# Patient Record
Sex: FEMALE | Race: WHITE | NOT HISPANIC OR LATINO | URBAN - METROPOLITAN AREA
[De-identification: names, ages, dates, MRNs, and addresses within clinical notes are randomized per-mention and may not be internally consistent; named-entity substitution may affect disease eponyms.]

---

## 2017-09-29 ENCOUNTER — EMERGENCY (EMERGENCY)
Facility: HOSPITAL | Age: 32
LOS: 1 days | Discharge: HOME | End: 2017-09-29
Attending: EMERGENCY MEDICINE | Admitting: EMERGENCY MEDICINE
Payer: MEDICAID

## 2017-09-29 VITALS
RESPIRATION RATE: 18 BRPM | DIASTOLIC BLOOD PRESSURE: 79 MMHG | TEMPERATURE: 99 F | HEART RATE: 88 BPM | OXYGEN SATURATION: 99 % | SYSTOLIC BLOOD PRESSURE: 136 MMHG | HEIGHT: 64 IN | WEIGHT: 132.28 LBS

## 2017-09-29 DIAGNOSIS — Z3A.01 LESS THAN 8 WEEKS GESTATION OF PREGNANCY: ICD-10-CM

## 2017-09-29 DIAGNOSIS — O20.9 HEMORRHAGE IN EARLY PREGNANCY, UNSPECIFIED: ICD-10-CM

## 2017-09-29 LAB
ANION GAP SERPL CALC-SCNC: 16 MMOL/L — SIGNIFICANT CHANGE UP (ref 5–17)
APPEARANCE UR: CLEAR — SIGNIFICANT CHANGE UP
BASOPHILS NFR BLD AUTO: 0.3 % — SIGNIFICANT CHANGE UP (ref 0–2)
BILIRUB UR-MCNC: NEGATIVE — SIGNIFICANT CHANGE UP
BLD GP AB SCN SERPL QL: NEGATIVE — SIGNIFICANT CHANGE UP
BUN SERPL-MCNC: 14 MG/DL — SIGNIFICANT CHANGE UP (ref 7–23)
CALCIUM SERPL-MCNC: 10.5 MG/DL — SIGNIFICANT CHANGE UP (ref 8.4–10.5)
CHLORIDE SERPL-SCNC: 98 MMOL/L — SIGNIFICANT CHANGE UP (ref 96–108)
CO2 SERPL-SCNC: 23 MMOL/L — SIGNIFICANT CHANGE UP (ref 22–31)
COLOR SPEC: YELLOW — SIGNIFICANT CHANGE UP
CREAT SERPL-MCNC: 0.81 MG/DL — SIGNIFICANT CHANGE UP (ref 0.5–1.3)
DIFF PNL FLD: (no result)
EOSINOPHIL NFR BLD AUTO: 1.9 % — SIGNIFICANT CHANGE UP (ref 0–6)
GLUCOSE SERPL-MCNC: 100 MG/DL — HIGH (ref 70–99)
GLUCOSE UR QL: NEGATIVE — SIGNIFICANT CHANGE UP
HCG SERPL-ACNC: HIGH MIU/ML
HCG UR QL: POSITIVE — SIGNIFICANT CHANGE UP
HCT VFR BLD CALC: 44.7 % — SIGNIFICANT CHANGE UP (ref 34.5–45)
HGB BLD-MCNC: 15.1 G/DL — SIGNIFICANT CHANGE UP (ref 11.5–15.5)
INR BLD: 1.02 — SIGNIFICANT CHANGE UP (ref 0.88–1.16)
KETONES UR-MCNC: NEGATIVE — SIGNIFICANT CHANGE UP
LEUKOCYTE ESTERASE UR-ACNC: NEGATIVE — SIGNIFICANT CHANGE UP
LYMPHOCYTES # BLD AUTO: 15.3 % — SIGNIFICANT CHANGE UP (ref 13–44)
MCHC RBC-ENTMCNC: 31.4 PG — SIGNIFICANT CHANGE UP (ref 27–34)
MCHC RBC-ENTMCNC: 33.8 G/DL — SIGNIFICANT CHANGE UP (ref 32–36)
MCV RBC AUTO: 92.9 FL — SIGNIFICANT CHANGE UP (ref 80–100)
MONOCYTES NFR BLD AUTO: 7.7 % — SIGNIFICANT CHANGE UP (ref 2–14)
NEUTROPHILS NFR BLD AUTO: 74.8 % — SIGNIFICANT CHANGE UP (ref 43–77)
NITRITE UR-MCNC: NEGATIVE — SIGNIFICANT CHANGE UP
PH UR: 5.5 — SIGNIFICANT CHANGE UP (ref 5–8)
PLATELET # BLD AUTO: 246 K/UL — SIGNIFICANT CHANGE UP (ref 150–400)
POTASSIUM SERPL-MCNC: 4.1 MMOL/L — SIGNIFICANT CHANGE UP (ref 3.5–5.3)
POTASSIUM SERPL-SCNC: 4.1 MMOL/L — SIGNIFICANT CHANGE UP (ref 3.5–5.3)
PROT UR-MCNC: NEGATIVE MG/DL — SIGNIFICANT CHANGE UP
PROTHROM AB SERPL-ACNC: 11.3 SEC — SIGNIFICANT CHANGE UP (ref 9.8–12.7)
RBC # BLD: 4.81 M/UL — SIGNIFICANT CHANGE UP (ref 3.8–5.2)
RBC # FLD: 12.6 % — SIGNIFICANT CHANGE UP (ref 10.3–16.9)
RH IG SCN BLD-IMP: POSITIVE — SIGNIFICANT CHANGE UP
SODIUM SERPL-SCNC: 137 MMOL/L — SIGNIFICANT CHANGE UP (ref 135–145)
SP GR SPEC: 1.01 — SIGNIFICANT CHANGE UP (ref 1–1.03)
UROBILINOGEN FLD QL: 0.2 E.U./DL — SIGNIFICANT CHANGE UP
WBC # BLD: 11.4 K/UL — HIGH (ref 3.8–10.5)
WBC # FLD AUTO: 11.4 K/UL — HIGH (ref 3.8–10.5)

## 2017-09-29 PROCEDURE — 81001 URINALYSIS AUTO W/SCOPE: CPT

## 2017-09-29 PROCEDURE — 36415 COLL VENOUS BLD VENIPUNCTURE: CPT

## 2017-09-29 PROCEDURE — 81025 URINE PREGNANCY TEST: CPT

## 2017-09-29 PROCEDURE — 76817 TRANSVAGINAL US OBSTETRIC: CPT

## 2017-09-29 PROCEDURE — 76801 OB US < 14 WKS SINGLE FETUS: CPT

## 2017-09-29 PROCEDURE — 85610 PROTHROMBIN TIME: CPT

## 2017-09-29 PROCEDURE — 80048 BASIC METABOLIC PNL TOTAL CA: CPT

## 2017-09-29 PROCEDURE — 86850 RBC ANTIBODY SCREEN: CPT

## 2017-09-29 PROCEDURE — 86900 BLOOD TYPING SEROLOGIC ABO: CPT

## 2017-09-29 PROCEDURE — 86901 BLOOD TYPING SEROLOGIC RH(D): CPT

## 2017-09-29 PROCEDURE — 76801 OB US < 14 WKS SINGLE FETUS: CPT | Mod: 26

## 2017-09-29 PROCEDURE — 84702 CHORIONIC GONADOTROPIN TEST: CPT

## 2017-09-29 PROCEDURE — 76817 TRANSVAGINAL US OBSTETRIC: CPT | Mod: 26

## 2017-09-29 PROCEDURE — 99285 EMERGENCY DEPT VISIT HI MDM: CPT

## 2017-09-29 PROCEDURE — 85025 COMPLETE CBC W/AUTO DIFF WBC: CPT

## 2017-09-29 PROCEDURE — 99284 EMERGENCY DEPT VISIT MOD MDM: CPT

## 2017-09-29 NOTE — CONSULT NOTE ADULT - SUBJECTIVE AND OBJECTIVE BOX
31yo  at 5w by LMP 17 presents for vaginal bleeding. She reports 1 brief episode of bright red blood that resolved to spotting. She reports mild spotting now. She reports mild pelvic cramping but denies contracitons, lof. she otherwise has no symptoms    GynHx: LMP 17  denies h/o fibroids, polyps, ovarian cysts  h/o abnormal pap s/p cpossible cryotherapy  denies PMH, PSH   NKDA    Vital Signs Last 24 Hrs  T(C): 36.7 (30 Sep 2017 00:37), Max: 37.3 (29 Sep 2017 18:46)  T(F): 98.1 (30 Sep 2017 00:37), Max: 99.1 (29 Sep 2017 18:46)  HR: 87 (30 Sep 2017 00:37) (87 - 88)  BP: 117/73 (30 Sep 2017 00:37) (117/73 - 136/79)  BP(mean): --  RR: 18 (30 Sep 2017 00:37) (18 - 18)  SpO2: 81% (30 Sep 2017 00:37) (81% - 99%)  Gen: NAD  Card: RRR no m/r/g  Pulm: CTAB no crackles or wheezes  Abd: nontender, nondistended, no rebound or guarding  : normal appearing external genitalia. on speculum exam no blood visualized in the vaginal vault. on bimanual exam no tenderness ob b/l adnex or cervix                          15.1   11.4  )-----------( 246      ( 29 Sep 2017 19:23 )             44.7       137  |  98  |  14  ----------------------------<  100<H>  4.1   |  23  |  0.81    Ca    10.5      29 Sep 2017 19:23    Urinalysis Basic - ( 29 Sep 2017 19:26 )    Color: x / Appearance: x / SG: x / pH: x  Gluc: x / Ketone: x  / Bili: x / Urobili: x   Blood: x / Protein: x / Nitrite: x   Leuk Esterase: x / RBC: < 5 /HPF / WBC < 5 /HPF   Sq Epi: x / Non Sq Epi: Few /HPF / Bacteria: Present /HPF    [facility] [place] [id number]    TVUS:  FINDINGS:   By dates, the estimated gestational age is 5 weeks 1 day.    Mean sac diameter is 1.0 cm, corresponding to a gestational age of 5   weeks 5 days.  A fetal pole is not identified.  A yolk sac is visible with internal diameter 0.2 cm, which is normal.    The cervix is closed with a length of 3.3 cm.    The uterus is anteverted. The uterus is 10.4 x 4.5 x 5.0 cm.  No   myometrial abnormalities are seen. There is trace endometrial fluid.   Trace free fluid is seen in the cul-de-sac.    The right ovary is normal in size, measuring 3.6 x 1.8 x 2.0 cm. No right   ovarian masses are seen. The left ovary is normal in size, measuring 4.5   x 1.9 x 2.1 cm. The left ovary contains a 2.3 cm corpus luteum. Doppler   evaluation demonstrates flow to both ovaries with no evidence of torsion.     In the left adnexa, there is a 4.0 x 1.3 x 1.2 cm tubular simple cystic   structure suspicious for hydrosalpinx. No significant free fluid is   identified.      IMPRESSION:   1.  Identification of an intrauterine gestational sac and yolk sac   without visualization of a fetal pole. This may be related to early stage   of pregnancy. Recommend repeat ultrasound in 7-14 days or as clinically   warranted. Closed cervix.  2.  Tubular cystic structure left adnexa is suspicious for hydrosalpinx.

## 2017-09-29 NOTE — CONSULT NOTE ADULT - ASSESSMENT
31yo  at 5w presents for vaginal bleeding now resolved. TVUS showed IUP with left adnexa suspicious for hydrosalpinx. cannot rule out heterotopic pregnancy  -recommend patient be discharged home to return to ED in 48 hours for repeat beta hCG and and TVUS  -patient to return to ED sooner if she experiences severe pelvic pain not improved with tylenol, heavy vaginal bleeding, or fever greater than 100.4F    plan discussed with Dr. Do

## 2017-09-30 VITALS
HEART RATE: 87 BPM | RESPIRATION RATE: 18 BRPM | DIASTOLIC BLOOD PRESSURE: 73 MMHG | SYSTOLIC BLOOD PRESSURE: 117 MMHG | OXYGEN SATURATION: 81 % | TEMPERATURE: 98 F

## 2017-09-30 RX ORDER — ACETAMINOPHEN 500 MG
1000 TABLET ORAL ONCE
Qty: 0 | Refills: 0 | Status: DISCONTINUED | OUTPATIENT
Start: 2017-09-30 | End: 2017-10-03

## 2017-09-30 NOTE — ED PROVIDER NOTE - PHYSICAL EXAMINATION
CONSTITUTIONAL: Well appearing, well nourished, awake, alert and in no apparent distress.  HEENT: Head is atraumatic. Eyes clear bilaterally, normal EOMI. Airway patent.  CARDIAC: Normal rate, regular rhythm.  Heart sounds S1, S2.   RESPIRATORY: Breath sounds clear and equal bilaterally.  GASTROINTESTINAL: Abdomen soft, non-tender, no guarding.  MUSCULOSKELETAL: Spine appears normal, range of motion is not limited, no muscle or joint tenderness.   NEUROLOGICAL: Alert and oriented, no focal deficits, no motor or sensory deficits.  SKIN: Skin normal color for race, warm, dry and intact. No evidence of rash.  PSYCHIATRIC: Alert and oriented to person, place, time/situation. normal mood and affect. no apparent risk to self or others.  Pelvic: deferred to gyn

## 2017-09-30 NOTE — ED ADULT NURSE REASSESSMENT NOTE - NS ED NURSE REASSESS COMMENT FT1
Patient has been discharged home instructions given, patient verbalized understanding regarding to come back to the ED U/S blood work.

## 2017-09-30 NOTE — ED PROVIDER NOTE - OBJECTIVE STATEMENT
Pt previously healthy  at 5w by LMP 17 w episode of vaginal  bleeding bright red now w some residual spotting. some assoc pelvic cramping, no contractions, abd pain, n/v/f/c, sob, cp. Had intercourse last night. Does not have established gyn.

## 2017-09-30 NOTE — ED PROVIDER NOTE - MEDICAL DECISION MAKING DETAILS
VB in pregnancy, US reviewed gyn consulted, otherwise hds, cbc stable VB in pregnancy, US and labs reviewed, gyn consulted for concern for heterotopic vs hydrsalpinx, otherwise hds, cbc stable, advised return in 48 hrs for rpt betahcg and TVUS.   Discussed with pt results of work up, strict return precautions, and need for follow up.  Pt expressed understanding and agrees with plan.

## 2017-10-01 ENCOUNTER — EMERGENCY (EMERGENCY)
Facility: HOSPITAL | Age: 32
LOS: 1 days | Discharge: HOME | End: 2017-10-01
Attending: EMERGENCY MEDICINE | Admitting: EMERGENCY MEDICINE
Payer: MEDICAID

## 2017-10-01 VITALS
RESPIRATION RATE: 16 BRPM | HEART RATE: 98 BPM | WEIGHT: 132.28 LBS | TEMPERATURE: 99 F | DIASTOLIC BLOOD PRESSURE: 82 MMHG | OXYGEN SATURATION: 99 % | SYSTOLIC BLOOD PRESSURE: 134 MMHG

## 2017-10-01 VITALS
SYSTOLIC BLOOD PRESSURE: 110 MMHG | RESPIRATION RATE: 16 BRPM | HEART RATE: 89 BPM | TEMPERATURE: 99 F | DIASTOLIC BLOOD PRESSURE: 65 MMHG | OXYGEN SATURATION: 99 %

## 2017-10-01 LAB
EXTRA GREEN TOP TUBE: SIGNIFICANT CHANGE UP
EXTRA LAVENDER TOP TUBE: SIGNIFICANT CHANGE UP
HCG SERPL-ACNC: HIGH MIU/ML

## 2017-10-01 PROCEDURE — 99284 EMERGENCY DEPT VISIT MOD MDM: CPT | Mod: 25

## 2017-10-01 PROCEDURE — 99285 EMERGENCY DEPT VISIT HI MDM: CPT

## 2017-10-01 PROCEDURE — 76830 TRANSVAGINAL US NON-OB: CPT | Mod: 26

## 2017-10-01 PROCEDURE — 76830 TRANSVAGINAL US NON-OB: CPT

## 2017-10-01 PROCEDURE — 84702 CHORIONIC GONADOTROPIN TEST: CPT

## 2017-10-01 NOTE — ED PROVIDER NOTE - MEDICAL DECISION MAKING DETAILS
Pt returns for repeat ? HCG and TVUS. Will d/w gyn. Pt returns for repeat HCG and TVUS. IUP w/ hydrosalpinx, possible heterotopic given hx HCG. Will d/w gyn. Will check lab, TVUS. Dispo pending w/u and clinical status

## 2017-10-01 NOTE — ED PROVIDER NOTE - PROGRESS NOTE DETAILS
Gyn paged Radiology at bedside to perform US D/w Gyn. Requesting repeat official sono. They will see the pt Gyn re-paged. Sono performed by radiology resident Gyn re-paged. Sono performed by radiology resident, report to follow Gyn will come and see the pt Pt seen by gyn, US reviewed, d/w attending Dr Do. Pt to return to OB in 1 week for repeat US and HCG. Return precautions.

## 2017-10-01 NOTE — ED PROVIDER NOTE - OBJECTIVE STATEMENT
Pt with no sig PMHx presents for repeat visit. Pt is , LMP  @ 5 weeks gestation returns for repeat HCG and US. Pt was seen in the ED 2 days ago for cramping and vaginal bleeding. Pt had US at that time which showed intrauterine gestational sac and yolk sac, but no fetal pole. US at that time also showed Tubular cystic structure left adnexa is suspicious for hydrosalpinx. Pt was seen by gyn at that time, and was advised to return in 2 days for repeat HCG and TVUS as they could not r/o heterotopic. Pt reports mild intermittent cramping and minimal intermittent spotting.

## 2017-10-01 NOTE — ED ADULT NURSE NOTE - OBJECTIVE STATEMENT
32y F, , 5weeks pregnant, presents to ED for evaluation. States previously seen in ER and US and blood work was performed, states was urged to come to ER for follow up. States "before it was too early to hear heart beat so they said come back" States only "few drops of blood" no pain. No vaginal d/c. no urinary s/s. No lightheadedness, no dizziness. NAD. Will continue to monitor.

## 2017-10-01 NOTE — ED ADULT NURSE NOTE - CHPI ED SYMPTOMS NEG
no abdominal pain/no chills/no nausea/no dysuria/no vomiting/no back pain/no fever/no discharge/no vaginal discharge/no pain

## 2017-10-02 NOTE — ED ADULT NURSE REASSESSMENT NOTE - NS ED NURSE REASSESS COMMENT FT1
Pt received from MD Olvera. Pt A&Ox3 with no s.s of acute distress at this time. Pt denies pain at this time.  Pt awaiting gyn.  Will continue to monitor.

## 2017-10-02 NOTE — CONSULT NOTE ADULT - SUBJECTIVE AND OBJECTIVE BOX
31yo  at 5w by LMP 17 presents for repeat bHCG and u/s due to elevated bHCG for dates and possible hydrosalpingx vs. heterotopic pregnancy. She reports no VB or abdominal cramping.       GynHx: LMP 17  denies h/o fibroids, polyps, ovarian cysts  h/o abnormal pap s/p cpossible cryotherapy  denies PMH, PSH   NKDA    Vital Signs Last 24 Hrs  Vital Signs Last 24 Hrs  T(C): 37 (01 Oct 2017 23:14), Max: 37.2 (01 Oct 2017 19:53)  T(F): 98.6 (01 Oct 2017 23:14), Max: 99 (01 Oct 2017 19:53)  HR: 89 (01 Oct 2017 23:14) (89 - 98)  BP: 110/65 (01 Oct 2017 23:14) (110/65 - 134/82)  BP(mean): --  RR: 16 (01 Oct 2017 23:14) (16 - 16)  SpO2: 99% (01 Oct 2017 23:14) (99% - 99%)    Gen: NAD  Card: RRR no m/r/g  Pulm: CTAB no crackles or wheezes  Abd: nontender, nondistended, no rebound or guarding  : normal appearing external genitalia. on speculum exam no blood visualized in the vaginal vault. on bimanual exam no tenderness ob b/l adnex or cervix (per ED attending)    HCG Quantitative, Serum (10.01.17 @ 20:54)    HCG Quantitative, Serum: 77074.0    Pending TVUS 33yo  at 5w by LMP 17 presents for repeat bHCG and u/s due to elevated bHCG for dates and possible hydrosalpingx vs. heterotopic pregnancy. She reports no VB or abdominal cramping.       GynHx: LMP 17  denies h/o fibroids, polyps, ovarian cysts  h/o abnormal pap s/p cpossible cryotherapy  denies PMH, PSH   NKDA    Vital Signs Last 24 Hrs  Vital Signs Last 24 Hrs  T(C): 37 (01 Oct 2017 23:14), Max: 37.2 (01 Oct 2017 19:53)  T(F): 98.6 (01 Oct 2017 23:14), Max: 99 (01 Oct 2017 19:53)  HR: 89 (01 Oct 2017 23:14) (89 - 98)  BP: 110/65 (01 Oct 2017 23:14) (110/65 - 134/82)  BP(mean): --  RR: 16 (01 Oct 2017 23:14) (16 - 16)  SpO2: 99% (01 Oct 2017 23:14) (99% - 99%)    Gen: NAD  Card: RRR no m/r/g  Pulm: CTAB no crackles or wheezes  Abd: nontender, nondistended, no rebound or guarding  : normal appearing external genitalia. on speculum exam no blood visualized in the vaginal vault. on bimanual exam no tenderness ob b/l adnex or cervix (per ED attending)    HCG Quantitative, Serum (10.01.17 @ 20:54)    HCG Quantitative, Serum: 24666.0    < from: US Transvaginal (10.01.17 @ 23:02) >  ******PRELIMINARY REPORT******    ******PRELIMINARY REPORT******            EXAM:  US TRANSVAGINAL                          PROCEDURE DATE:  10/01/2017               ******PRELIMINARY REPORT******    ******PRELIMINARY REPORT******              INTERPRETATION:  OBSTETRICAL ULTRASOUND - FIRST TRIMESTER dated 10/1/2017   11:02 PM    INDICATION: First trimester pregnancy with intermittent pain. History of   recent vaginal bleeding. Tubular structure in left adnexa on prior study.   Evaluate for ectopic pregnancy. Reported LMP of 2017.    TECHNIQUE: Transvaginal views of the pelvis were obtained.    PRIOR STUDIES: 2017    FINDINGS:   By dates, the estimated gestational age is 5 weeks 2 days.    A single intrauterine gestation is visible with an average ultrasound age   of 6 weeks 2 days.   Mean sac diameter is 1.5 cm, corresponding to a gestational age of 6   weeks 2 days.  There appears to be a fetal pole. Fetal cardiac motion appears visible.   Accurate evaluation of fetal heart rate was not possible due to technical   factors.  A yolk sac is visible with internal diameter 0.24 cm, which is normal.    A normal amount of amniotic fluid is evident. No subchorionic bleed is   visible.  The cervix is closed with a length of 3.0 cm.    The uterus is anteverted. The uterus measures approximately 6.7 x 4.0 x   5.0 cm. No myometrial abnormalities are seen.     The right ovary is normal in size, measuring 3.4 x 1.4 x 3.8 cm. No right   ovarian masses are seen. The left ovary is normal in size, measuring 3.1   x 1.0 x 2.6 cm. No left ovarian masses are seen.     There is a 2.3 x 1.3 x 1.6 cm fluid collection in the left adnexa as seen   on prior study. No vascularity is noted within this fluid collection. No   rim of increased vascularity is seen. This fluid collection may   communicate with the left fallopian tube. No echogenic structures are   seen within this fluid collection. This fluid collection was previously   measured as 4.0 x 1.3 x 1.2 cm and described as a tubular simple cystic   structure.    Doppler evaluation demonstrates flow to both ovaries with no evidence of   torsion.      IMPRESSION:   Intrauterine pregnancy with average ultrasound age of 6 weeks 2 days.   Fluid collection in left adnexa most likely represents hydrosalpinx. No   evidence of ovarian torsion. A fetal pole appears to be developing, but   accurate evaluation of fetal heart rate was not possible. Close follow-up   is recommended.            "Thank you for the opportunity to participate in the care of this   patient."  ******PRELIMINARY REPORT******    ******PRELIMINARY REPORT******          PAOLA MCCLENDON M.D., RADIOLOGY RESIDENT          < end of copied text >

## 2017-10-02 NOTE — CONSULT NOTE ADULT - ASSESSMENT
31yo  at 5w presents for f/u labs and US. TVUS pending  - bHCG almost doubled. Consistent with IUP  -   -patient to return to ED sooner if she experiences severe pelvic pain not improved with tylenol, heavy vaginal bleeding, or fever greater than 100.4F    plan discussed with  33yo  at 5w presents for f/u labs and US. Both consistent with IUP  - bHCG almost doubled. Consistent with IUP due to gestational sac, fetal pole and cardiac activity seen  - Suspected hydrosalphinx still seen on US but has become smaller.   - Pt to f.u with an OBGYN in a week for f/u bHCG  - patient to return to ED if she experiences severe pelvic pain not improved with tylenol, heavy vaginal bleeding, or fever greater than 100.4F    plan discussed with Dr. Do

## 2017-10-05 DIAGNOSIS — O20.9 HEMORRHAGE IN EARLY PREGNANCY, UNSPECIFIED: ICD-10-CM

## 2017-10-05 DIAGNOSIS — Z3A.01 LESS THAN 8 WEEKS GESTATION OF PREGNANCY: ICD-10-CM

## 2018-06-07 NOTE — ED PROVIDER NOTE - DATA REVIEWED, MDM
INDICATION: Charisse Almanzar is a 26 year old female who presents for colposcopy.  See prior notes, problem list.  [place] 1/26 showed LUKE II at 12 00, LUKE I at 6 00.  (See pathology report).  She has elected close follow up.          Informed consent obtained for procedure.               COLPOSCOPIC EXAM:  Satisfactory      Using standard technique and acetic acid application, the cervix and vagina were examined using the colposcope.  The transformation zone appeared abnormal, with minimal white epithelium anterior and posterior.  Representative biopsy of 12:00 and 6:00 was sent.  ECC was performed.   Monsels applied for hemostasis.    Colposcopic Impression: LUKE II pap.  Possible dysplasia.     PLAN: Post Colposcopy Instructions were given.  Call in 1 week for biopsy results.  Will advise followup depending on results.      
vital signs
